# Patient Record
Sex: MALE | Race: BLACK OR AFRICAN AMERICAN | Employment: FULL TIME | URBAN - METROPOLITAN AREA
[De-identification: names, ages, dates, MRNs, and addresses within clinical notes are randomized per-mention and may not be internally consistent; named-entity substitution may affect disease eponyms.]

---

## 2021-10-16 ENCOUNTER — APPOINTMENT (OUTPATIENT)
Dept: GENERAL RADIOLOGY | Age: 51
End: 2021-10-16
Payer: MEDICARE

## 2021-10-16 ENCOUNTER — HOSPITAL ENCOUNTER (EMERGENCY)
Age: 51
Discharge: HOME | End: 2021-10-16
Attending: EMERGENCY MEDICINE
Payer: MEDICARE

## 2021-10-16 VITALS
RESPIRATION RATE: 17 BRPM | HEART RATE: 72 BPM | TEMPERATURE: 98.1 F | OXYGEN SATURATION: 95 % | DIASTOLIC BLOOD PRESSURE: 91 MMHG | SYSTOLIC BLOOD PRESSURE: 167 MMHG | WEIGHT: 215 LBS

## 2021-10-16 DIAGNOSIS — R42 LIGHTHEADEDNESS: Primary | ICD-10-CM

## 2021-10-16 DIAGNOSIS — E86.0 DEHYDRATION: ICD-10-CM

## 2021-10-16 DIAGNOSIS — R73.9 HYPERGLYCEMIA: ICD-10-CM

## 2021-10-16 LAB
ABSOLUTE EOS #: 0.12 K/UL (ref 0–0.44)
ABSOLUTE IMMATURE GRANULOCYTE: <0.03 K/UL (ref 0–0.3)
ABSOLUTE LYMPH #: 2.55 K/UL (ref 1.1–3.7)
ABSOLUTE MONO #: 0.41 K/UL (ref 0.1–1.2)
ANION GAP SERPL CALCULATED.3IONS-SCNC: 15 MMOL/L (ref 9–17)
BASOPHILS # BLD: 1 % (ref 0–2)
BASOPHILS ABSOLUTE: 0.06 K/UL (ref 0–0.2)
BNP INTERPRETATION: NORMAL
BUN BLDV-MCNC: 11 MG/DL (ref 6–20)
BUN/CREAT BLD: ABNORMAL (ref 9–20)
CALCIUM SERPL-MCNC: 9.2 MG/DL (ref 8.6–10.4)
CHLORIDE BLD-SCNC: 103 MMOL/L (ref 98–107)
CO2: 22 MMOL/L (ref 20–31)
CREAT SERPL-MCNC: 0.81 MG/DL (ref 0.7–1.2)
DIFFERENTIAL TYPE: ABNORMAL
EOSINOPHILS RELATIVE PERCENT: 2 % (ref 1–4)
GFR AFRICAN AMERICAN: >60 ML/MIN
GFR NON-AFRICAN AMERICAN: >60 ML/MIN
GFR SERPL CREATININE-BSD FRML MDRD: ABNORMAL ML/MIN/{1.73_M2}
GFR SERPL CREATININE-BSD FRML MDRD: ABNORMAL ML/MIN/{1.73_M2}
GLUCOSE BLD-MCNC: 186 MG/DL (ref 75–110)
GLUCOSE BLD-MCNC: 208 MG/DL (ref 70–99)
HCT VFR BLD CALC: 40.6 % (ref 40.7–50.3)
HEMOGLOBIN: 13.5 G/DL (ref 13–17)
IMMATURE GRANULOCYTES: 0 %
LYMPHOCYTES # BLD: 44 % (ref 24–43)
MCH RBC QN AUTO: 27.5 PG (ref 25.2–33.5)
MCHC RBC AUTO-ENTMCNC: 33.3 G/DL (ref 28.4–34.8)
MCV RBC AUTO: 82.7 FL (ref 82.6–102.9)
MONOCYTES # BLD: 7 % (ref 3–12)
NRBC AUTOMATED: 0 PER 100 WBC
PDW BLD-RTO: 12.7 % (ref 11.8–14.4)
PLATELET # BLD: 193 K/UL (ref 138–453)
PLATELET ESTIMATE: ABNORMAL
PMV BLD AUTO: 12.2 FL (ref 8.1–13.5)
POTASSIUM SERPL-SCNC: 3.7 MMOL/L (ref 3.7–5.3)
PRO-BNP: 30 PG/ML
RBC # BLD: 4.91 M/UL (ref 4.21–5.77)
RBC # BLD: ABNORMAL 10*6/UL
SARS-COV-2, RAPID: NOT DETECTED
SEG NEUTROPHILS: 46 % (ref 36–65)
SEGMENTED NEUTROPHILS ABSOLUTE COUNT: 2.7 K/UL (ref 1.5–8.1)
SODIUM BLD-SCNC: 140 MMOL/L (ref 135–144)
SPECIMEN DESCRIPTION: NORMAL
TROPONIN INTERP: NORMAL
TROPONIN INTERP: NORMAL
TROPONIN T: NORMAL NG/ML
TROPONIN T: NORMAL NG/ML
TROPONIN, HIGH SENSITIVITY: 13 NG/L (ref 0–22)
TROPONIN, HIGH SENSITIVITY: 13 NG/L (ref 0–22)
WBC # BLD: 5.9 K/UL (ref 3.5–11.3)
WBC # BLD: ABNORMAL 10*3/UL

## 2021-10-16 PROCEDURE — 82947 ASSAY GLUCOSE BLOOD QUANT: CPT

## 2021-10-16 PROCEDURE — 83880 ASSAY OF NATRIURETIC PEPTIDE: CPT

## 2021-10-16 PROCEDURE — 71045 X-RAY EXAM CHEST 1 VIEW: CPT

## 2021-10-16 PROCEDURE — 99284 EMERGENCY DEPT VISIT MOD MDM: CPT

## 2021-10-16 PROCEDURE — 2580000003 HC RX 258: Performed by: STUDENT IN AN ORGANIZED HEALTH CARE EDUCATION/TRAINING PROGRAM

## 2021-10-16 PROCEDURE — 84484 ASSAY OF TROPONIN QUANT: CPT

## 2021-10-16 PROCEDURE — 87635 SARS-COV-2 COVID-19 AMP PRB: CPT

## 2021-10-16 PROCEDURE — 96360 HYDRATION IV INFUSION INIT: CPT

## 2021-10-16 PROCEDURE — 96361 HYDRATE IV INFUSION ADD-ON: CPT

## 2021-10-16 PROCEDURE — 93005 ELECTROCARDIOGRAM TRACING: CPT | Performed by: STUDENT IN AN ORGANIZED HEALTH CARE EDUCATION/TRAINING PROGRAM

## 2021-10-16 PROCEDURE — 80048 BASIC METABOLIC PNL TOTAL CA: CPT

## 2021-10-16 PROCEDURE — 85025 COMPLETE CBC W/AUTO DIFF WBC: CPT

## 2021-10-16 RX ORDER — SODIUM CHLORIDE, SODIUM LACTATE, POTASSIUM CHLORIDE, AND CALCIUM CHLORIDE .6; .31; .03; .02 G/100ML; G/100ML; G/100ML; G/100ML
2000 INJECTION, SOLUTION INTRAVENOUS ONCE
Status: COMPLETED | OUTPATIENT
Start: 2021-10-16 | End: 2021-10-16

## 2021-10-16 RX ADMIN — SODIUM CHLORIDE, POTASSIUM CHLORIDE, SODIUM LACTATE AND CALCIUM CHLORIDE 2000 ML: 600; 310; 30; 20 INJECTION, SOLUTION INTRAVENOUS at 13:14

## 2021-10-16 ASSESSMENT — ENCOUNTER SYMPTOMS
SHORTNESS OF BREATH: 0
ABDOMINAL PAIN: 0
SORE THROAT: 0
VOMITING: 0
COUGH: 0
NAUSEA: 0

## 2021-10-16 NOTE — LETTER
[facility] ED  [address] [address]  Phone: [phone]               October 16, 2021    Patient: Fannie Caceres   YOB: 1970   Date of Visit: 10/16/2021       To Whom It May Concern:    Fannie Caceres was seen and treated in our emergency department on 10/16/2021. Demetrius Rodney return to work on 10/18/21. .      Sincerely,       Meghan Johnson RN         Signature:__________________________________

## 2021-10-16 NOTE — ED NOTES
Pt walked to bathroom with an even and steady gait. Pt was hooked back up to cardiac monitor. Pt denies any needs at this time.      Amilcar Barragan RN  10/16/21 [id number]

## 2021-10-16 NOTE — ED PROVIDER NOTES
[facility] ED  Emergency Department Encounter  EmergencyMedicine Resident     Pt Name:Shabbir Gonzalez  MRN: [id number]  [place] 1970  Date of evaluation: 10/16/21  PCP:  No primary care provider on file. CHIEF COMPLAINT       Chief Complaint   Patient presents with    Dizziness       HISTORY OF PRESENT ILLNESS  (Location/Symptom, Timing/Onset, Context/Setting, Quality, Duration, Modifying Factors, Severity.)      Autumn Liang is a 46 y.o. male who presents with lightheadedness, chills, shakiness. Patient has a past medical history of diabetes on Metformin. He states that when he got out of bed this morning he felt lightheaded, developed the shakes and felt chilly. States he has never had this before. Denied any dizziness, chest pain, shortness of breath, focal weakness, headache, fevers, recent illness, urinary complaints. Patient does not checked his sugars at home. PAST MEDICAL / SURGICAL / SOCIAL / FAMILY HISTORY      has a past medical history of Diabetes mellitus ([facility] [address] has no past surgical history on file. Social History     Socioeconomic History    Marital status:      Spouse name: Not on file    Number of children: Not on file    Years of education: Not on file    Highest education level: Not on file   Occupational History    Not on file   Tobacco Use    Smoking status: Not on file   Substance and Sexual Activity    Alcohol use: Not on file    Drug use: Never    Sexual activity: Not on file   Other Topics Concern    Not on file   Social History Narrative    Not on file     Social Determinants of Health     Financial Resource Strain:     Difficulty of Paying Living Expenses:    Food Insecurity:     Worried About Running Out of Food in the Last Year:     [address] in the Last Year:    Transportation Needs:     Lack of Transportation (Medical):      Lack of Transportation (Non-Medical):    Physical Activity:     Days of Exercise per Appearance: He is well-developed. He is not ill-appearing. HENT:      Head: Normocephalic and atraumatic. Mouth/Throat:      Mouth: Mucous membranes are dry. Eyes:      Extraocular Movements: Extraocular movements intact. Pupils: Pupils are equal, round, and reactive to light. Cardiovascular:      Rate and Rhythm: Normal rate and regular rhythm. Pulmonary:      Effort: Pulmonary effort is normal.      Breath sounds: Normal breath sounds. Abdominal:      General: There is no distension. Palpations: Abdomen is soft. Tenderness: There is no abdominal tenderness. Musculoskeletal:         General: Normal range of motion. Cervical back: Normal range of motion and neck supple. Skin:     General: Skin is warm and dry. Neurological:      General: No focal deficit present. Mental Status: He is alert and oriented to person, place, and time. DIFFERENTIAL  DIAGNOSIS     PLAN (LABS / IMAGING / EKG):  Orders Placed This Encounter   Procedures    COVID-19, Rapid    XR CHEST PORTABLE    CBC WITH AUTO DIFFERENTIAL    BASIC METABOLIC PANEL    Troponin    Troponin    Brain Natriuretic Peptide    POC Glucose Fingerstick    EKG 12 Lead       MEDICATIONS ORDERED:  Orders Placed This Encounter   Medications    lactated ringers bolus       DIAGNOSTIC RESULTS / EMERGENCY DEPARTMENT COURSE / MDM   LAB RESULTS:  Results for orders placed or performed during the hospital encounter of 10/16/21   COVID-19, Rapid    Specimen: Nasopharyngeal Swab   Result Value Ref Range    Specimen Description . NASOPHARYNGEAL SWAB     SARS-CoV-2, Rapid Not Detected Not Detected   CBC WITH AUTO DIFFERENTIAL   Result Value Ref Range    WBC 5.9 3.5 - 11.3 k/uL    RBC 4.91 4.21 - 5.77 m/uL    Hemoglobin 13.5 13.0 - 17.0 g/dL    Hematocrit 40.6 (L) 40.7 - 50.3 %    MCV 82.7 82.6 - 102.9 fL    MCH 27.5 25.2 - 33.5 pg    MCHC 33.3 28.4 - 34.8 g/dL    RDW 12.7 11.8 - 14.4 %    Platelets 603 093 - 258 k/uL MPV 12.2 8.1 - 13.5 fL    NRBC Automated 0.0 0.0 per 100 WBC    Differential Type NOT REPORTED     Seg Neutrophils 46 36 - 65 %    Lymphocytes 44 (H) 24 - 43 %    Monocytes 7 3 - 12 %    Eosinophils % 2 1 - 4 %    Basophils 1 0 - 2 %    Immature Granulocytes 0 0 %    Segs Absolute 2.70 1.50 - 8.10 k/uL    Absolute Lymph # 2.55 1.10 - 3.70 k/uL    Absolute Mono # 0.41 0.10 - 1.20 k/uL    Absolute Eos # 0.12 0.00 - 0.44 k/uL    Basophils Absolute 0.06 0.00 - 0.20 k/uL    Absolute Immature Granulocyte <0.03 0.00 - 0.30 k/uL    WBC Morphology NOT REPORTED     RBC Morphology NOT REPORTED     Platelet Estimate NOT REPORTED    BASIC METABOLIC PANEL   Result Value Ref Range    Glucose 208 (H) 70 - 99 mg/dL    BUN 11 6 - 20 mg/dL    CREATININE 0.81 0.70 - 1.20 mg/dL    Bun/Cre Ratio NOT REPORTED 9 - 20    Calcium 9.2 8.6 - 10.4 mg/dL    Sodium 140 135 - 144 mmol/L    Potassium 3.7 3.7 - 5.3 mmol/L    Chloride 103 98 - 107 mmol/L    CO2 22 20 - 31 mmol/L    Anion Gap 15 9 - 17 mmol/L    GFR Non-African American >60 >60 mL/min    GFR African American >60 >60 mL/min    GFR Comment          GFR Staging NOT REPORTED    Troponin   Result Value Ref Range    Troponin, High Sensitivity 13 0 - 22 ng/L    Troponin T NOT REPORTED <0.03 ng/mL    Troponin Interp NOT REPORTED    Troponin   Result Value Ref Range    Troponin, High Sensitivity 13 0 - 22 ng/L    Troponin T NOT REPORTED <0.03 ng/mL    Troponin Interp NOT REPORTED    Brain Natriuretic Peptide   Result Value Ref Range    Pro-BNP 30 <300 pg/mL    BNP Interpretation Pro-BNP Reference Range:    POC Glucose Fingerstick   Result Value Ref Range    POC Glucose 186 (H) 75 - 110 mg/dL         RADIOLOGY:  XR CHEST PORTABLE   Final Result   No acute process.               EKG    EKG Interpretation    Interpreted by me    Rhythm: normal sinus   Rate: normal  Axis: normal  Ectopy: none  Conduction: normal  ST Segments: no acute change  T Waves: no acute change  Q Waves: none    Clinical Impression: no acute changes and normal EKG    All EKG's are interpreted by the Emergency Department Physician who either signs or Co-signs this chart in the absence of a cardiologist.    Hiram:    This is a well-appearing 61-year-old male with a past history of diabetes. Blood sugar here was 186. He appears very dehydrated on examination which is likely the cause of his lightheadedness upon standing. He denies having any other symptoms, physical exam is unremarkable with exception of dry mucous membranes. Plan for IV fluid rehydration, check cardiac labs, anticipate discharge if work-up unremarkable. ED Course as of Oct 16 1611   Sat Oct 16, 2021   1325 SARS-CoV-2, Rapid: Not Detected [CS]   0394 IMPRESSION:  No acute process. XR CHEST PORTABLE [CS]   1506 Pro-BNP: 30 [CS]   1343 Troponin, High Sensitivity: 13 [CS]   1455 Troponin, High Sensitivity: 13 [CS]      ED Course User Index  [CS] Bruno Cleveland DO     Work-up is unremarkable. Instructed the patient to fill his prescription for home glucometer and check sugars frequently for the next 2 weeks and follow-up with PCP regarding hyperglycemia. Patient was not symptomatic in the emergency room. Patient discharged follow-up PCP. PROCEDURES:      CONSULTS:  None    CRITICAL CARE:  Please see attending note    FINAL IMPRESSION      1. Lightheadedness    2. Dehydration    3. Hyperglycemia        DISPOSITION / PLAN     DISPOSITION        PATIENT REFERRED TO:  No follow-up provider specified.     DISCHARGE MEDICATIONS:  Discharge Medication List as of 10/16/2021  3:00 PM          Bruno Cleveland DO  Emergency Medicine Resident    (Please note that portions of thisnote were completed with a voice recognition program.  Efforts were made to edit the dictations but occasionally words are mis-transcribed.)        Bruno Cleveland DO  Resident  10/16/21 1790

## 2021-10-16 NOTE — ED PROVIDER NOTES
[facility] ED     Emergency Department     Faculty Attestation        I performed a history and physical examination of the patient and discussed management with the resident. I reviewed the residents note and agree with the documented findings and plan of care. Any areas of disagreement are noted on the chart. I was personally present for the key portions of any procedures. I have documented in the chart those procedures where I was not present during the key portions. I have reviewed the emergency nurses triage note. I agree with the chief complaint, past medical history, past surgical history, allergies, medications, social and family history as documented unless otherwise noted below. For mid-level providers such as nurse practitioners as well as physicians assistants:    I have personally seen and evaluated the patient. I find the patient's history and physical exam are consistent with NP/PA documentation. I agree with the care provided, treatment rendered, disposition, & follow-up plan. Additional findings are as noted. Vital Signs: BP (!) 167/91   Pulse 72   Temp 98.1 °F (36.7 °C) (Oral)   Resp 17   Wt 215 lb (97.5 kg)   SpO2 95%   PCP:  No primary care provider on file. Pertinent Comments:     Presents with a episode of chills and myalgias. States he still from bed quickly felt dizzy then he had rigors and felt like his body was shaking. The symptoms have since resolved. He was around his girlfriend who was sick with a cold. Unvaccinated from [facility]. Denies any chest pain shortness of breath, nausea vomiting or diarrhea and currently is completely asymptomatic.   Will check EKG, labs, chest x-ray, reassessment    EKG Interpretation    Interpreted by me    Rhythm: normal sinus   Rate: normal  Ectopy: none  Conduction: normal  ST Segments: no acute change  T Waves: no acute change  Q Waves: none    Clinical Impression: normal EKG        Suze Michaud MD    Attending Emergency Medicine Physician              Jesus Beal MD  10/16/21 4013

## 2021-10-16 NOTE — ED NOTES
Pt hooked back up to cardiac monitor and LR. 500ml of LR have been administered. Pt denies any needs at this time. No distress noted.      Jeffry Reyes RN  10/16/21 [id number]

## 2021-10-16 NOTE — ED NOTES
Pt arrived to ED via self. Pt stated while laying in bed this morning he felt dizzy. Pt denies feeling light headed or that the room was spinning. Pt stated he has a history of diabetes and takes metformin. Pt denies any pain    Pt is A&O x4. Pt is calm and approachable. Pt shows no signs of distress. Glucose taken was 186.      Katie Forbes, RN  10/16/21 Brea Jeronimo RN  10/16/21 [id number]

## 2021-10-18 LAB
EKG ATRIAL RATE: 73 BPM
EKG P AXIS: 60 DEGREES
EKG P-R INTERVAL: 160 MS
EKG Q-T INTERVAL: 382 MS
EKG QRS DURATION: 84 MS
EKG QTC CALCULATION (BAZETT): 420 MS
EKG R AXIS: 8 DEGREES
EKG T AXIS: 27 DEGREES
EKG VENTRICULAR RATE: 73 BPM